# Patient Record
Sex: MALE | Race: WHITE | NOT HISPANIC OR LATINO | Employment: FULL TIME | URBAN - METROPOLITAN AREA
[De-identification: names, ages, dates, MRNs, and addresses within clinical notes are randomized per-mention and may not be internally consistent; named-entity substitution may affect disease eponyms.]

---

## 2018-08-13 ENCOUNTER — RECORDS - HEALTHEAST (OUTPATIENT)
Dept: LAB | Facility: CLINIC | Age: 58
End: 2018-08-13

## 2018-08-13 LAB
ALBUMIN SERPL-MCNC: 4 G/DL (ref 3.5–5)
ALP SERPL-CCNC: 88 U/L (ref 45–120)
ALT SERPL W P-5'-P-CCNC: 30 U/L (ref 0–45)
ANION GAP SERPL CALCULATED.3IONS-SCNC: 11 MMOL/L (ref 5–18)
AST SERPL W P-5'-P-CCNC: 21 U/L (ref 0–40)
BILIRUB SERPL-MCNC: 1 MG/DL (ref 0–1)
BUN SERPL-MCNC: 19 MG/DL (ref 8–22)
CALCIUM SERPL-MCNC: 9.2 MG/DL (ref 8.5–10.5)
CHLORIDE BLD-SCNC: 108 MMOL/L (ref 98–107)
CHOLEST SERPL-MCNC: 194 MG/DL
CO2 SERPL-SCNC: 21 MMOL/L (ref 22–31)
CREAT SERPL-MCNC: 0.98 MG/DL (ref 0.7–1.3)
FASTING STATUS PATIENT QL REPORTED: YES
GFR SERPL CREATININE-BSD FRML MDRD: >60 ML/MIN/1.73M2
GLUCOSE BLD-MCNC: 97 MG/DL (ref 70–125)
HDLC SERPL-MCNC: 41 MG/DL
LDLC SERPL CALC-MCNC: 121 MG/DL
POTASSIUM BLD-SCNC: 4.2 MMOL/L (ref 3.5–5)
PROT SERPL-MCNC: 6.6 G/DL (ref 6–8)
PSA SERPL-MCNC: 0.4 NG/ML (ref 0–3.5)
SODIUM SERPL-SCNC: 140 MMOL/L (ref 136–145)
TRIGL SERPL-MCNC: 161 MG/DL

## 2023-01-13 ENCOUNTER — LAB REQUISITION (OUTPATIENT)
Dept: LAB | Facility: CLINIC | Age: 63
End: 2023-01-13
Payer: COMMERCIAL

## 2023-01-13 DIAGNOSIS — Z13.220 ENCOUNTER FOR SCREENING FOR LIPOID DISORDERS: ICD-10-CM

## 2023-01-13 DIAGNOSIS — Z12.5 ENCOUNTER FOR SCREENING FOR MALIGNANT NEOPLASM OF PROSTATE: ICD-10-CM

## 2023-01-13 LAB
CHOLEST SERPL-MCNC: 214 MG/DL
HDLC SERPL-MCNC: 48 MG/DL
LDLC SERPL CALC-MCNC: 133 MG/DL
NONHDLC SERPL-MCNC: 166 MG/DL
PSA SERPL-MCNC: 0.51 NG/ML (ref 0–4.5)
TRIGL SERPL-MCNC: 164 MG/DL

## 2023-01-13 PROCEDURE — 80061 LIPID PANEL: CPT | Mod: ORL | Performed by: PHYSICIAN ASSISTANT

## 2023-01-13 PROCEDURE — G0103 PSA SCREENING: HCPCS | Mod: ORL | Performed by: PHYSICIAN ASSISTANT

## 2023-08-06 ENCOUNTER — HOSPITAL ENCOUNTER (OUTPATIENT)
Facility: CLINIC | Age: 63
Setting detail: OBSERVATION
Discharge: HOME | End: 2023-08-07
Attending: EMERGENCY MEDICINE | Admitting: HOSPITALIST
Payer: COMMERCIAL

## 2023-08-06 ENCOUNTER — APPOINTMENT (OUTPATIENT)
Dept: RADIOLOGY | Facility: CLINIC | Age: 63
End: 2023-08-06
Attending: EMERGENCY MEDICINE
Payer: COMMERCIAL

## 2023-08-06 DIAGNOSIS — R07.9 CHEST PAIN, UNSPECIFIED TYPE: ICD-10-CM

## 2023-08-06 PROBLEM — E66.3 OVERWEIGHT: Status: ACTIVE | Noted: 2023-08-06

## 2023-08-06 LAB
ANION GAP SERPL CALCULATED.3IONS-SCNC: 10 MMOL/L (ref 5–18)
APTT PPP: 29 SECONDS (ref 22–38)
ATRIAL RATE - MUSE: 78 BPM
BASOPHILS # BLD AUTO: 0.1 10E3/UL (ref 0–0.2)
BASOPHILS NFR BLD AUTO: 1 %
BUN SERPL-MCNC: 16 MG/DL (ref 8–22)
CALCIUM SERPL-MCNC: 8.9 MG/DL (ref 8.5–10.5)
CHLORIDE BLD-SCNC: 106 MMOL/L (ref 98–107)
CO2 SERPL-SCNC: 23 MMOL/L (ref 22–31)
CREAT SERPL-MCNC: 1.05 MG/DL (ref 0.7–1.3)
D DIMER PPP FEU-MCNC: <=0.27 UG/ML FEU (ref 0–0.5)
DIASTOLIC BLOOD PRESSURE - MUSE: NORMAL MMHG
EOSINOPHIL # BLD AUTO: 0.2 10E3/UL (ref 0–0.7)
EOSINOPHIL NFR BLD AUTO: 2 %
ERYTHROCYTE [DISTWIDTH] IN BLOOD BY AUTOMATED COUNT: 13.1 % (ref 10–15)
GFR SERPL CREATININE-BSD FRML MDRD: 81 ML/MIN/1.73M2
GLUCOSE BLD-MCNC: 90 MG/DL (ref 70–125)
HCT VFR BLD AUTO: 46.1 % (ref 40–53)
HGB BLD-MCNC: 15.6 G/DL (ref 13.3–17.7)
IMM GRANULOCYTES # BLD: 0 10E3/UL
IMM GRANULOCYTES NFR BLD: 0 %
INR PPP: 0.97 (ref 0.85–1.15)
INTERPRETATION ECG - MUSE: NORMAL
LYMPHOCYTES # BLD AUTO: 2.1 10E3/UL (ref 0.8–5.3)
LYMPHOCYTES NFR BLD AUTO: 31 %
MCH RBC QN AUTO: 31 PG (ref 26.5–33)
MCHC RBC AUTO-ENTMCNC: 33.8 G/DL (ref 31.5–36.5)
MCV RBC AUTO: 92 FL (ref 78–100)
MONOCYTES # BLD AUTO: 0.6 10E3/UL (ref 0–1.3)
MONOCYTES NFR BLD AUTO: 9 %
NEUTROPHILS # BLD AUTO: 3.8 10E3/UL (ref 1.6–8.3)
NEUTROPHILS NFR BLD AUTO: 57 %
NRBC # BLD AUTO: 0 10E3/UL
NRBC BLD AUTO-RTO: 0 /100
P AXIS - MUSE: 31 DEGREES
PLATELET # BLD AUTO: 196 10E3/UL (ref 150–450)
POTASSIUM BLD-SCNC: 3.9 MMOL/L (ref 3.5–5)
PR INTERVAL - MUSE: 152 MS
QRS DURATION - MUSE: 92 MS
QT - MUSE: 364 MS
QTC - MUSE: 414 MS
R AXIS - MUSE: 37 DEGREES
RBC # BLD AUTO: 5.03 10E6/UL (ref 4.4–5.9)
SODIUM SERPL-SCNC: 139 MMOL/L (ref 136–145)
SYSTOLIC BLOOD PRESSURE - MUSE: NORMAL MMHG
T AXIS - MUSE: -2 DEGREES
TROPONIN I SERPL-MCNC: 0.01 NG/ML (ref 0–0.29)
TROPONIN I SERPL-MCNC: 0.01 NG/ML (ref 0–0.29)
TROPONIN I SERPL-MCNC: <0.01 NG/ML (ref 0–0.29)
VENTRICULAR RATE- MUSE: 78 BPM
WBC # BLD AUTO: 6.7 10E3/UL (ref 4–11)

## 2023-08-06 PROCEDURE — 84484 ASSAY OF TROPONIN QUANT: CPT | Mod: 91 | Performed by: HOSPITALIST

## 2023-08-06 PROCEDURE — 84484 ASSAY OF TROPONIN QUANT: CPT | Performed by: EMERGENCY MEDICINE

## 2023-08-06 PROCEDURE — 85025 COMPLETE CBC W/AUTO DIFF WBC: CPT | Performed by: EMERGENCY MEDICINE

## 2023-08-06 PROCEDURE — 85610 PROTHROMBIN TIME: CPT | Performed by: EMERGENCY MEDICINE

## 2023-08-06 PROCEDURE — 93005 ELECTROCARDIOGRAM TRACING: CPT | Performed by: EMERGENCY MEDICINE

## 2023-08-06 PROCEDURE — 85730 THROMBOPLASTIN TIME PARTIAL: CPT | Performed by: EMERGENCY MEDICINE

## 2023-08-06 PROCEDURE — G0378 HOSPITAL OBSERVATION PER HR: HCPCS

## 2023-08-06 PROCEDURE — 36415 COLL VENOUS BLD VENIPUNCTURE: CPT | Performed by: EMERGENCY MEDICINE

## 2023-08-06 PROCEDURE — 99285 EMERGENCY DEPT VISIT HI MDM: CPT | Mod: 25

## 2023-08-06 PROCEDURE — 85379 FIBRIN DEGRADATION QUANT: CPT | Performed by: EMERGENCY MEDICINE

## 2023-08-06 PROCEDURE — 71045 X-RAY EXAM CHEST 1 VIEW: CPT

## 2023-08-06 PROCEDURE — 250N000013 HC RX MED GY IP 250 OP 250 PS 637: Performed by: EMERGENCY MEDICINE

## 2023-08-06 PROCEDURE — 36415 COLL VENOUS BLD VENIPUNCTURE: CPT | Performed by: HOSPITALIST

## 2023-08-06 PROCEDURE — 80048 BASIC METABOLIC PNL TOTAL CA: CPT | Performed by: EMERGENCY MEDICINE

## 2023-08-06 PROCEDURE — 99223 1ST HOSP IP/OBS HIGH 75: CPT | Performed by: HOSPITALIST

## 2023-08-06 RX ORDER — NITROGLYCERIN 0.4 MG/1
0.4 TABLET SUBLINGUAL EVERY 5 MIN PRN
Status: DISCONTINUED | OUTPATIENT
Start: 2023-08-06 | End: 2023-08-07 | Stop reason: HOSPADM

## 2023-08-06 RX ORDER — ACETAMINOPHEN 650 MG/1
650 SUPPOSITORY RECTAL EVERY 6 HOURS PRN
Status: DISCONTINUED | OUTPATIENT
Start: 2023-08-06 | End: 2023-08-07 | Stop reason: HOSPADM

## 2023-08-06 RX ORDER — ASPIRIN 81 MG/1
162 TABLET, CHEWABLE ORAL ONCE
Status: DISCONTINUED | OUTPATIENT
Start: 2023-08-06 | End: 2023-08-06

## 2023-08-06 RX ORDER — ACETAMINOPHEN 325 MG/1
650 TABLET ORAL EVERY 6 HOURS PRN
Status: DISCONTINUED | OUTPATIENT
Start: 2023-08-06 | End: 2023-08-07 | Stop reason: HOSPADM

## 2023-08-06 RX ORDER — MAGNESIUM HYDROXIDE/ALUMINUM HYDROXICE/SIMETHICONE 120; 1200; 1200 MG/30ML; MG/30ML; MG/30ML
30 SUSPENSION ORAL EVERY 4 HOURS PRN
Status: DISCONTINUED | OUTPATIENT
Start: 2023-08-06 | End: 2023-08-07 | Stop reason: HOSPADM

## 2023-08-06 RX ORDER — ASPIRIN 81 MG/1
162 TABLET, CHEWABLE ORAL ONCE
Status: COMPLETED | OUTPATIENT
Start: 2023-08-06 | End: 2023-08-06

## 2023-08-06 RX ORDER — ASPIRIN 81 MG/1
81 TABLET ORAL DAILY
Status: DISCONTINUED | OUTPATIENT
Start: 2023-08-07 | End: 2023-08-07 | Stop reason: HOSPADM

## 2023-08-06 RX ORDER — MULTIVITAMIN,THERAPEUTIC
1 TABLET ORAL DAILY
COMMUNITY

## 2023-08-06 RX ADMIN — NITROGLYCERIN 0.4 MG: 0.4 TABLET SUBLINGUAL at 13:43

## 2023-08-06 RX ADMIN — ASPIRIN 81 MG CHEWABLE TABLET 162 MG: 81 TABLET CHEWABLE at 15:42

## 2023-08-06 ASSESSMENT — ACTIVITIES OF DAILY LIVING (ADL)
ADLS_ACUITY_SCORE: 35
ADLS_ACUITY_SCORE: 31
ADLS_ACUITY_SCORE: 31
DEPENDENT_IADLS:: INDEPENDENT
ADLS_ACUITY_SCORE: 31
ADLS_ACUITY_SCORE: 31

## 2023-08-06 NOTE — ED NOTES
Pt states pain started when pulling weeds @ 1130 today. Pain increases with deep breath.  Family Hx of MI.  States only med is multivitamin.

## 2023-08-06 NOTE — H&P
"Hospital Medicine Service History and Physical  [facility]: [facility]    Tim Eaton is a 63 year old male who  has no past medical history on file.  Bilateral cataracts  Chief Complaint: Chest pain    Assessment and Plan  Principal Problem:    Chest pain, unspecified type  Active Problems:    Overweight  Chest pain order set with nm stress, tele, trops. ROHIT @ [place]. PRN nitros    Estimated body mass index is 27.89 kg/m  as calculated from the following:    Height as of this encounter: 1.803 m (5' 11\").    Weight as of this encounter: 90.7 kg (200 lb).  DVTP: short stay  Code Status: No Order  Disposition: Observation     History of Present Illness  Tim Eaton presents after sudden \"sharp\" chest pain that began while he was pulling weeds today. He had associated diaphoresis. No Nausea or SOB. His Sx persisted for a few hours prior to arrival. He took aspirin at home. He had a similar event in December shoveling heavy snow. He's concerned given his father  of an MI at 63. He's now asymptomatic after nitros in the ED. Denies tobacco or EtOH use. He works a mostly sedentary job at [employer]. Takes no meds, no recent med changes or vaccination. His wife Petr is with him.    ED triage note:  Patient started having chest pain doing yard work around 11:15. Patient states it feels like he's been punched in the chest. Family history of MI in 50s and 60s. Did have similar experience back in December clearing snow.    ED course:  In the ED, patient presents hemodynamically stable, afebrile, stable on room air  Initial troponin 0.01, normal BMP and CBC, negative D-dimer  EKG normal sinus, S waves lead I     Results for orders placed or performed during the hospital encounter of 23   XR Chest Port 1 View    Impression    IMPRESSION: Negative chest.      Medications   nitroGLYcerin (NITROSTAT) sublingual tablet 0.4 mg (0.4 mg Sublingual $Given 23 1347)   aspirin (ASA) chewable tablet 162 mg (162 mg " Oral $Given 8/6/23 9261)       Physical exam:  Appears nad in the bed  Anicteric sclera, PERRL  No JVD  RRR without murmur  Lungs CTA B/L  Abd soft NT  normal affect, alert  Vital signs reviewed by me    Wt Readings from Last 4 Encounters:   08/06/23 90.7 kg (200 lb)          family history is not on file.father - MI   has no past surgical history on file.   No Known Allergies    Mumtaz Schmitt MD, MPH  [facility]  [facility]  [facility]   Phone: #[phone]

## 2023-08-06 NOTE — PHARMACY-ADMISSION MEDICATION HISTORY
Pharmacist Admission Medication History    Admission medication history is complete. The information provided in this note is only as accurate as the sources available at the time of the update.    Medication reconciliation/reorder completed by provider prior to medication history? No    Information Source(s): Patient and CareEverywhere/SureScripts via in-person using droplet mask.    Pertinent Information: Doesn't take any prescriptions currently.    Changes made to PTA medication list:  Added: Multivitamin  Deleted: None  Changed: None    Medication Affordability:  Not including over the counter (OTC) medications, was there a time in the past 3 months when you did not take your medications as prescribed because of cost?: No    Allergies reviewed with patient and updates made in EHR: yes    Medication History Completed By: Brittney Ruffin, Brenna, BCPS, DPLA 8/6/2023 2:16 PM    Prior to Admission medications    Medication Sig Last Dose Taking? Auth Provider Long Term End Date   multivitamin, therapeutic (THERA-VIT) TABS tablet Take 1 tablet by mouth daily 8/4/2023 Yes Unknown, Entered By History

## 2023-08-06 NOTE — ED NOTES
EMERGENCY DEPARTMENT SIGN OUT NOTE        ED COURSE AND MEDICAL DECISION MAKING  Patient was signed out to me by Dr Aramis Quinteros at 2:15 PM.    In brief, Tim Eaton is a 63 year old male who initially presented for evaluation of chest pain and shortness of breath that started around 11:15 AM today while doing yard work.    At time of sign out, disposition was pending troponin, CXR, and likely admission.    Troponin is negative.  Chest x-ray is unremarkable.  Patient was admitted to the hospitalist, Dr. Schmitt for ACS rule out.    FINAL IMPRESSION    1. Chest pain, unspecified type        ED MEDS  Medications   nitroGLYcerin (NITROSTAT) sublingual tablet 0.4 mg (0.4 mg Sublingual $Given 8/6/23 2370)   aspirin (ASA) chewable tablet 162 mg (has no administration in time range)       LAB  Labs Ordered and Resulted from Time of ED Arrival to Time of ED Departure   BASIC METABOLIC PANEL - Normal       Result Value    Sodium 139      Potassium 3.9      Chloride 106      Carbon Dioxide (CO2) 23      Anion Gap 10      Urea Nitrogen 16      Creatinine 1.05      Calcium 8.9      Glucose 90      GFR Estimate 81     TROPONIN I - Normal    Troponin I 0.01     INR - Normal    INR 0.97     PARTIAL THROMBOPLASTIN TIME - Normal    aPTT 29     D DIMER QUANTITATIVE - Normal    D-Dimer Quantitative <=0.27     CBC WITH PLATELETS AND DIFFERENTIAL    WBC Count 6.7      RBC Count 5.03      Hemoglobin 15.6      Hematocrit 46.1      MCV 92      MCH 31.0      MCHC 33.8      RDW 13.1      Platelet Count 196      % Neutrophils 57      % Lymphocytes 31      % Monocytes 9      % Eosinophils 2      % Basophils 1      % Immature Granulocytes 0      NRBCs per 100 WBC 0      Absolute Neutrophils 3.8      Absolute Lymphocytes 2.1      Absolute Monocytes 0.6      Absolute Eosinophils 0.2      Absolute Basophils 0.1      Absolute Immature Granulocytes 0.0      Absolute NRBCs 0.0         RADIOLOGY    XR Chest Port 1 View   Final Result   IMPRESSION: Negative  chest.          DISCHARGE MEDS  New Prescriptions    No medications on file       I, Jonas Kebede, am serving as a scribe to documentservices personally performed by Claudia Alejo, * based on my observation and the provider's statements to me. I, Claudia Deluca, * attest that Jonas Kebede is acting in a scribe capacity, has observed my performance of the services and has documented them in accordance with my direction.      Claudia Deulca MD  [facility] EMERGENCY ROOM  [facility] [facility] [address] [phone]       Claudia Deluca MD  08/06/23 5679

## 2023-08-06 NOTE — PROGRESS NOTES
"Care Management Initial Consult    General Information  Assessment completed with: Patient, Spouse or significant other, wife Petr at bedside  Primary Care Provider verified and updated as needed: Yes- goes to [facility] in [place]. His PCP retired but he is aware of how to establish new PCP at same clinic, wife also able to help. Denies needs from CM.   Readmission within the last 30 days: no previous admission in last 30 days     Advance Care Planning: Advance Care Planning Reviewed:  (no HCD on file, declines blank HCD, verbally states \"my wife Petr\" would make medical decisions for him IF he were not able to make them for himself)    Communication Assessment  Patient's communication style: spoken language (English or Bilingual)        Cognitive  Cognitive/Neuro/Behavioral: alert and appropriately oriented, able to provide detailed history, pleasant                     Living Environment:   People in home: spouse  Petr  Current living Arrangements: house (split level)      Able to return to prior arrangements: yes     Family/Social Support:  Care provided by: self  Provides care for: no one  Marital Status:   Wife, Children (2 adult sons)  Petr       Description of Support System: Supportive, Involved       Current Resources:   Patient receiving home care services: No  Community Resources: None  Equipment /supplies currently used at home:  None     Employment/Financial:  Employment Status: employed full-time for [employer]   Financial Concerns: No concerns identified   Referral to Financial Worker: No     Does the patient's insurance plan have a 3 day qualifying hospital stay waiver?  No- unable to confirm. Has [employer] employer based insurance plan. Do not anticipate need for TCU placement at hospital discharge.     Lifestyle & Psychosocial Needs:  Social Determinants of Health     Tobacco Use: Not on file   Alcohol Use: Not on file   Financial Resource Strain: Not on file   Food Insecurity: " Not on file   Transportation Needs: Not on file   Physical Activity: Not on file   Stress: Not on file   Social Connections: Not on file   Intimate Partner Violence: Not on file   Depression: Not on file   Housing Stability: Not on file     Functional Status:  Prior to admission patient needed assistance:   Dependent ADLs:: Independent  Dependent IADLs:: Independent     Mental Health Status:  Mental Health Status:  (denies)       Chemical Dependency Status:  Chemical Dependency Status:  (denies)           Values/Beliefs:  Spiritual, Cultural Beliefs, Protestant Practices, Values that affect care: no             Additional Information:  Chart reviewed.     CM met with patient and wife Petr at bedside; assessment completed. Lives with wife in their own private [facility] level home. He is independent with all cares, including driving and still works full time for [employer]. No assistive devices. No private duty or skilled HC services.     Anticipate return home with wife; no CM needs. Wife will transport home at hospital discharge.     Elizabeth Rose RN

## 2023-08-06 NOTE — ED PROVIDER NOTES
EMERGENCY DEPARTMENT ENCOUNTER      NAME: Tim Eaton  AGE: 63 year old male  YOB: 1960  MRN: [id number]  EVALUATION DATE & TIME: 2023  1:10 PM    PCP: No primary care provider on file.    ED PROVIDER: Aramis Quinteros D.O.      Chief Complaint   Patient presents with    Chest Pain       FINAL IMPRESSION:  1. Nonspecific chest pain        ED COURSE & MEDICAL DECISION MAKIN:22 PM I met with the patient to gather history and to perform my initial exam. I discussed the plan for care while in the Emergency Department.  1:22 PM Talked about discharge vs going home with patient and patient does not feel comfortable going home.  2:05 PM Patient signed out to the oncoming provider, Dr. Deluca.         Pertinent Labs & Imaging studies reviewed. (See chart for details)  63 year old male presents to the Emergency Department for evaluation of left-sided chest pain with shortness of breath.  Patient does have significant medical history in his family of both his grandfather and father dying in the mid 50s early 60s from MIs.  Differential today does include ACS, PE, dissection, pneumothorax, other emergent process.  The chest pain was not reproducible in nature, exam was otherwise unremarkable.  EKG did not show any evidence of ischemia arrhythmia.  Lab testing is pending at time of turnover, but do anticipate admission.    Medical Decision Making    History:  Supplemental history from: Documented in chart, if applicable  External Record(s) reviewed: Documented in chart, if applicable.    Work Up:  Chart documentation includes differential considered and any EKGs or imaging independently interpreted by provider, where specified.  In additional to work up documented, I considered the following work up: Documented in chart, if applicable.    External consultation:  Discussion of management with another provider: Documented in chart, if applicable    Complicating factors:  Care impacted by chronic illness:  N/A  Care affected by social determinants of health: N/A    Disposition considerations: [facility] and he does not feel comfortable going home.        At the conclusion of the encounter I discussed the results of all of the tests and the disposition. The questions were answered. The patient or family acknowledged understanding and was agreeable with the care plan.        HPI    Patient information was obtained from: Patient    Use of : N/A     Tim Eaton is a 63 year old male who presents with chest pain.    Per Chart Review: No pertinent information available.    Patient reports that he had developed sudden chest pain around 11:15 today () while doing yard work. Pain has been constant since. He also endorsed feeling mildly short of breath. His pain is not worsened with exertion but states that he had been exerting himself yesterday (). He has had similar chest pain symptoms last 2022 (while clearing snow per nurse triage note) but it was not this constant. He took two Excedrin with aspirin today.    He denied any fever, cough, congestion, nausea, vomiting, or diarrhea. No personal history of medical problems. Patient's father and grandfather both  of heart attacks in there 60s. Has a history of cataract surgery. No other complaints at this time    REVIEW OF SYSTEMS  Constitutional:  Denies fever, chills, weight loss or weakness.  Eyes:  No pain, discharge, redness.  HENT:  Denies sore throat, ear pain, congestion  Respiratory: No wheeze or cough. Endorses shortness of breath.  Cardiovascular:  No palpitations. Endorses chest pain.  GI:  Denies abdominal pain, nausea, vomiting, diarrhea  : Denies dysuria, hematuria  Musculoskeletal:  Denies any new muscle/joint pain, swelling or loss of function.  Skin:  Denies rash, pallor  Neurologic:  Denies headache, focal weakness or sensory changes  Lymph: Denies swollen nodes    All other systems  "negative unless noted in HPI.    PAST MEDICAL HISTORY:  No past medical history on file.    PAST SURGICAL HISTORY:  No past surgical history on file.      CURRENT MEDICATIONS:    Current Facility-Administered Medications   Medication    nitroGLYcerin (NITROSTAT) sublingual tablet 0.4 mg     No current outpatient medications on file.         ALLERGIES:  No Known Allergies    FAMILY HISTORY:  No family history on file.    SOCIAL HISTORY:  Social History     Socioeconomic History    Marital status:        VITALS:  Patient Vitals for the past 24 hrs:   BP Temp Temp src Pulse Resp SpO2 Height Weight   08/06/23 1345 128/84 -- -- 62 19 97 % -- --   08/06/23 1327 131/85 -- -- 78 19 96 % -- --   08/06/23 1302 -- 97.2  F (36.2  C) Temporal -- -- -- -- --   08/06/23 1301 (!) 142/104 -- -- 83 18 95 % -- --   08/06/23 1300 -- -- -- -- -- -- 1.803 m (5' 11\") 90.7 kg (200 lb)       PHYSICAL EXAM    VITAL SIGNS: /84   Pulse 62   Temp 97.2  F (36.2  C) (Temporal)   Resp 19   Ht 1.803 m (5' 11\")   Wt 90.7 kg (200 lb)   SpO2 97%   BMI 27.89 kg/m      General Appearance: Well-appearing, well-nourished, no acute distress   Head:  Normocephalic, without obvious abnormality, atraumatic  Eyes:  PERRL, conjunctiva/corneas clear, EOM's intact,  ENT:  Lips, mucosa, and tongue normal, membranes are moist without pallor  Neck:  Normal ROM, symmetrical, trachea midline    Cardio:  Regular rate and rhythm, no murmur, rub or gallop, 2+ pulses symmetric in all extremities  Pulm:  Clear to auscultation bilaterally, respirations unlabored,  Abdomen:  Soft, non-tender, no rebound or guarding.  Musculoskeletal: Full ROM, no edema, no cyanosis, good ROM of major joints  Integument:  Warm, Dry, No erythema, No rash.    Neurologic:  Alert & oriented.  No focal deficits appreciated.  Ambulatory.  Psychiatric:  Affect normal, Judgment normal, Mood normal.      LABS  Results for orders placed or performed during the hospital encounter of " 08/06/23 (from the past 24 hour(s))   ECG 12-LEAD WITH MUSE (LHE)   Result Value Ref Range    Systolic Blood Pressure  mmHg    Diastolic Blood Pressure  mmHg    Ventricular Rate 78 BPM    Atrial Rate 78 BPM    IA Interval 152 ms    QRS Duration 92 ms     ms    QTc 414 ms    P Axis 31 degrees    R AXIS 37 degrees    T Axis -2 degrees    Interpretation ECG       Sinus rhythm  Normal ECG  No previous ECGs available  Confirmed by SEE ED PROVIDER NOTE FOR, ECG INTERPRETATION ([id number]),  Jose Manuel Ingram ([id number]) on 8/6/2023 1:31:37 PM     CBC with platelets differential    Narrative    The following orders were created for panel order CBC with platelets differential.  Procedure                               Abnormality         Status                     ---------                               -----------         ------                     CBC with platelets and d...[[id number]]                      Final result                 Please view results for these tests on the individual orders.   Basic metabolic panel   Result Value Ref Range    Sodium 139 136 - 145 mmol/L    Potassium 3.9 3.5 - 5.0 mmol/L    Chloride 106 98 - 107 mmol/L    Carbon Dioxide (CO2) 23 22 - 31 mmol/L    Anion Gap 10 5 - 18 mmol/L    Urea Nitrogen 16 8 - 22 mg/dL    Creatinine 1.05 0.70 - 1.30 mg/dL    Calcium 8.9 8.5 - 10.5 mg/dL    Glucose 90 70 - 125 mg/dL    GFR Estimate 81 >60 mL/min/1.73m2   INR   Result Value Ref Range    INR 0.97 0.85 - 1.15   Partial thromboplastin time   Result Value Ref Range    aPTT 29 22 - 38 Seconds   D dimer quantitative   Result Value Ref Range    D-Dimer Quantitative <=0.27 0.00 - 0.50 ug/mL FEU    Narrative    This D-dimer assay is intended for use in conjunction with a clinical pretest probability assessment model to exclude pulmonary embolism (PE) and deep venous thrombosis (DVT) in outpatients suspected of PE or DVT. The cut-off value is 0.50 ug/mL FEU.   CBC with platelets and differential   Result Value  Ref Range    WBC Count 6.7 4.0 - 11.0 10e3/uL    RBC Count 5.03 4.40 - 5.90 10e6/uL    Hemoglobin 15.6 13.3 - 17.7 g/dL    Hematocrit 46.1 40.0 - 53.0 %    MCV 92 78 - 100 fL    MCH 31.0 26.5 - 33.0 pg    MCHC 33.8 31.5 - 36.5 g/dL    RDW 13.1 10.0 - 15.0 %    Platelet Count 196 150 - 450 10e3/uL    % Neutrophils 57 %    % Lymphocytes 31 %    % Monocytes 9 %    % Eosinophils 2 %    % Basophils 1 %    % Immature Granulocytes 0 %    NRBCs per 100 WBC 0 <1 /100    Absolute Neutrophils 3.8 1.6 - 8.3 10e3/uL    Absolute Lymphocytes 2.1 0.8 - 5.3 10e3/uL    Absolute Monocytes 0.6 0.0 - 1.3 10e3/uL    Absolute Eosinophils 0.2 0.0 - 0.7 10e3/uL    Absolute Basophils 0.1 0.0 - 0.2 10e3/uL    Absolute Immature Granulocytes 0.0 <=0.4 10e3/uL    Absolute NRBCs 0.0 10e3/uL         RADIOLOGY  XR Chest Port 1 View    (Results Pending)        EKG:    Rate: 78 bpm  Rhythm: Normal Sinus Rhythm  Axis: Normal  Interval: Normal  Conduction: Normal  QRS: Normal  ST: Normal  T-wave: Normal  QT: Not prolonged  Comparison EKG: no previous available for comparison  Impression:  No acute ischemic change   I have independently reviewed and interpreted today's EKG, pending Cardiologist read          MEDICATIONS GIVEN IN THE EMERGENCY:  Medications   nitroGLYcerin (NITROSTAT) sublingual tablet 0.4 mg (0.4 mg Sublingual $Given 8/6/23 4841)       NEW PRESCRIPTIONS STARTED AT TODAY'S ER VISIT  New Prescriptions    No medications on file        I, Ra Copeland , am serving as a scribe to document services personally performed by Aramis Quinteros D.O., based on my observations and the provider's statements to me.  I, Aramis Quinteros D.O., attest that Ra Copeland  is acting in a scribe capacity, has observed my performance of the services and has documented them in accordance with my direction.     Aramis Quinteros D.O.  Emergency Medicine  [facility] EMERGENCY ROOM  [address] [facility]  [address]  [phone]  Dept: [phone]       Aramis Quinteros,   08/06/23 1411

## 2023-08-06 NOTE — PROGRESS NOTES
Received patient from ED ambulating independently with wife at bedside. Vital signs stable and patient is A&Ox4. Patient is on a cardiac diet for the time being until NPO at midnight for tests tomorrow. Educated on hospital policies, plans, tests, and environment. No questions at this time reported by patient and family. IV in place and patent. Labs drawn.

## 2023-08-06 NOTE — ED TRIAGE NOTES
Patient started having chest pain doing yard work around 11:15. Patient states it feels like he's been punched in the chest. Family history of MI in 50s and 60s. Did have similar experience back in December clearing snow.

## 2023-08-07 ENCOUNTER — APPOINTMENT (OUTPATIENT)
Dept: CARDIOLOGY | Facility: CLINIC | Age: 63
End: 2023-08-07
Attending: HOSPITALIST
Payer: COMMERCIAL

## 2023-08-07 ENCOUNTER — APPOINTMENT (OUTPATIENT)
Dept: NUCLEAR MEDICINE | Facility: CLINIC | Age: 63
End: 2023-08-07
Attending: HOSPITALIST
Payer: COMMERCIAL

## 2023-08-07 VITALS
HEART RATE: 81 BPM | WEIGHT: 202.2 LBS | DIASTOLIC BLOOD PRESSURE: 71 MMHG | OXYGEN SATURATION: 96 % | HEIGHT: 71 IN | SYSTOLIC BLOOD PRESSURE: 120 MMHG | BODY MASS INDEX: 28.31 KG/M2 | RESPIRATION RATE: 22 BRPM | TEMPERATURE: 98.6 F

## 2023-08-07 LAB
CV STRESS CURRENT BP HE: NORMAL
CV STRESS CURRENT HR HE: 104
CV STRESS CURRENT HR HE: 104
CV STRESS CURRENT HR HE: 105
CV STRESS CURRENT HR HE: 105
CV STRESS CURRENT HR HE: 106
CV STRESS CURRENT HR HE: 106
CV STRESS CURRENT HR HE: 107
CV STRESS CURRENT HR HE: 108
CV STRESS CURRENT HR HE: 110
CV STRESS CURRENT HR HE: 111
CV STRESS CURRENT HR HE: 113
CV STRESS CURRENT HR HE: 116
CV STRESS CURRENT HR HE: 117
CV STRESS CURRENT HR HE: 120
CV STRESS CURRENT HR HE: 122
CV STRESS CURRENT HR HE: 123
CV STRESS CURRENT HR HE: 128
CV STRESS CURRENT HR HE: 134
CV STRESS CURRENT HR HE: 136
CV STRESS CURRENT HR HE: 136
CV STRESS CURRENT HR HE: 145
CV STRESS CURRENT HR HE: 145
CV STRESS CURRENT HR HE: 147
CV STRESS CURRENT HR HE: 69
CV STRESS CURRENT HR HE: 78
CV STRESS CURRENT HR HE: 97
CV STRESS CURRENT HR HE: 98
CV STRESS CURRENT HR HE: 99
CV STRESS DEVIATION TIME HE: NORMAL
CV STRESS ECHO PERCENT HR HE: NORMAL
CV STRESS EXERCISE STAGE HE: NORMAL
CV STRESS EXERCISE STAGE REACHED HE: NORMAL
CV STRESS FINAL RESTING BP HE: NORMAL
CV STRESS FINAL RESTING HR HE: 98
CV STRESS MAX HR HE: 147
CV STRESS MAX TREADMILL GRADE HE: 16
CV STRESS MAX TREADMILL SPEED HE: 4.2
CV STRESS PEAK DIA BP HE: NORMAL
CV STRESS PEAK SYS BP HE: NORMAL
CV STRESS PHASE HE: NORMAL
CV STRESS PROTOCOL HE: NORMAL
CV STRESS RESTING PT POSITION HE: NORMAL
CV STRESS ST DEVIATION AMOUNT HE: NORMAL
CV STRESS ST DEVIATION ELEVATION HE: NORMAL
CV STRESS ST EVELATION AMOUNT HE: NORMAL
CV STRESS TEST TYPE HE: NORMAL
CV STRESS TOTAL STAGE TIME MIN 1 HE: NORMAL
RATE PRESSURE PRODUCT: NORMAL
STRESS ANGINA INDEX: 0
STRESS ECHO BASELINE DIASTOLIC HE: 82
STRESS ECHO BASELINE HR: 72
STRESS ECHO BASELINE SYSTOLIC BP: 120
STRESS ECHO CALCULATED PERCENT HR: 92 %
STRESS ECHO LAST STRESS DIASTOLIC BP: 88
STRESS ECHO LAST STRESS HR: 145
STRESS ECHO LAST STRESS SYSTOLIC BP: 166
STRESS ECHO POST ESTIMATED WORKLOAD: 10.5
STRESS ECHO POST EXERCISE DUR MIN: 9
STRESS ECHO POST EXERCISE DUR SEC: 5
STRESS ECHO TARGET HR: 159

## 2023-08-07 PROCEDURE — 93017 CV STRESS TEST TRACING ONLY: CPT | Performed by: INTERNAL MEDICINE

## 2023-08-07 PROCEDURE — G0378 HOSPITAL OBSERVATION PER HR: HCPCS

## 2023-08-07 PROCEDURE — 93016 CV STRESS TEST SUPVJ ONLY: CPT | Performed by: INTERNAL MEDICINE

## 2023-08-07 PROCEDURE — 93018 CV STRESS TEST I&R ONLY: CPT | Performed by: GENERAL ACUTE CARE HOSPITAL

## 2023-08-07 PROCEDURE — 250N000013 HC RX MED GY IP 250 OP 250 PS 637: Performed by: HOSPITALIST

## 2023-08-07 PROCEDURE — 93017 CV STRESS TEST TRACING ONLY: CPT

## 2023-08-07 PROCEDURE — 78452 HT MUSCLE IMAGE SPECT MULT: CPT | Mod: 26 | Performed by: GENERAL ACUTE CARE HOSPITAL

## 2023-08-07 PROCEDURE — 78452 HT MUSCLE IMAGE SPECT MULT: CPT

## 2023-08-07 PROCEDURE — 99239 HOSP IP/OBS DSCHRG MGMT >30: CPT | Performed by: HOSPITALIST

## 2023-08-07 PROCEDURE — 343N000001 HC RX 343: Performed by: HOSPITALIST

## 2023-08-07 PROCEDURE — A9500 TC99M SESTAMIBI: HCPCS | Performed by: HOSPITALIST

## 2023-08-07 RX ADMIN — Medication 8.7 MILLICURIE: at 10:25

## 2023-08-07 RX ADMIN — ASPIRIN 81 MG: 81 TABLET, COATED ORAL at 08:30

## 2023-08-07 RX ADMIN — Medication 28.6 MILLICURIE: at 12:45

## 2023-08-07 ASSESSMENT — ACTIVITIES OF DAILY LIVING (ADL)
ADLS_ACUITY_SCORE: 31

## 2023-08-07 NOTE — PLAN OF CARE
Goal Outcome Evaluation:         Denies chest pain, sOB, nausea. Tolerating diet well. Acitivity as tolereated. Discharge instructions reviewed. IV removed.     Missy Virgen RN

## 2023-08-07 NOTE — PLAN OF CARE
PRIMARY DIAGNOSIS: CHEST PAIN  OUTPATIENT/OBSERVATION GOALS TO BE MET BEFORE DISCHARGE:  1. Ruled out acute coronary syndrome (negative or stable Troponin):   Stress test in AM  2. Pain Status: Pain free.  3. Appropriate provocative testing performed: No  - Stress Test Procedure: Tomorrow  - Interpretation of cardiac rhythm per telemetry tech: NSR    4. Cleared by Consultants (if applicable):No  5. Return to near baseline physical activity: Yes  Discharge Planner Nurse   Safe discharge environment identified: Yes  Barriers to discharge: Yes       Entered by: Latrice Contreras RN 08/06/2023 8:42 PM     Please review provider order for any additional goals.   Nurse to notify provider when observation goals have been met and patient is ready for discharge.          Problem: Plan of Care - These are the overarching goals to be used throughout the patient stay.    Goal: Plan of Care Review  Description: The Plan of Care Review/Shift note should be completed every shift.  The Outcome Evaluation is a brief statement about your assessment that the patient is improving, declining, or no change.  This information will be displayed automatically on your shift note.  Outcome: Progressing   Goal Outcome Evaluation:

## 2023-08-07 NOTE — PROGRESS NOTES
Nuclear stress test completed as ordered using the Jose A protocol.  No chest pain during exercise.  Images and interpretation pending.  Amber Hannon RN

## 2023-08-07 NOTE — PROGRESS NOTES
PRIMARY DIAGNOSIS: CHEST PAIN  OUTPATIENT/OBSERVATION GOALS TO BE MET BEFORE DISCHARGE:  1. Ruled out acute coronary syndrome (negative or stable Troponin):  Yes  2. Pain Status: Pain free.  3. Appropriate provocative testing performed: No - stress test  - Stress Test Procedure: Nuclear exercise stress test  - Interpretation of cardiac rhythm per telemetry tech: NSR    4. Cleared by Consultants (if applicable):N/A  5. Return to near baseline physical activity: Yes  Discharge Planner Nurse   Safe discharge environment identified: Yes  Barriers to discharge: Yes - awaiting stress test       Entered by: Mary Byrd RN 08/07/2023      Please review provider order for any additional goals.   Nurse to notify provider when observation goals have been met and patient is ready for discharge.

## 2023-08-07 NOTE — DISCHARGE SUMMARY
"[facility]  Hospitalist Discharge Summary      Date of Admission:  8/6/2023  Date of Discharge:  8/7/2023  Discharging Provider: Mumtaz Schmitt MD  Discharge Service: Hospitalist Service    Discharge Diagnoses   Chest pain    Clinically Significant Risk Factors     # Overweight: Estimated body mass index is 28.2 kg/m  as calculated from the following:    Height as of this encounter: 1.803 m (5' 11\").    Weight as of this encounter: 91.7 kg (202 lb 3.2 oz).       Follow-ups Needed After Discharge       Unresulted Labs Ordered in the Past 30 Days of this Admission       No orders found for last 31 day(s).            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  Symptoms were associated with diaphoresis and persisted for a few hours.  His telemetry and troponin monitoring were negative.  He underwent nuclear stress testing which were negative with normal EF.    Consultations This Hospital Stay   None    Code Status   Full Code    Time Spent on this Encounter   I, Mumtaz Schmitt MD, personally saw the patient today and spent greater than 30 minutes discharging this patient.       Mumtaz Schmitt MD  [facility]  [address] [address]  Phone: [phone]  Fax: [phone]  ______________________________________________________________________    Physical Exam   Vital Signs: Temp: 98.6  F (37  C) Temp src: Oral BP: 120/71 Pulse: 81   Resp: 22 SpO2: 96 % O2 Device: None (Room air)    Weight: 202 lbs 3.2 oz  Lying in bed, no distress  Regular rate and rhythm, no new murmur  Lungs clear to auscultation  Mentation normal, answers questions appropriately, normal affect and normal eye contact       Primary Care Physician   [facility]    Discharge Orders      Reason for your hospital stay    Chest " pain     Follow-up and recommended labs and tests     Follow up with primary care provider, [facility], within 7 days for hospital follow- up.  No follow up labs or test are needed.     Activity    Your activity upon discharge: activity as tolerated     Diet    Follow this diet upon discharge: Orders Placed This Encounter      Combination Diet Regular Diet; Low Saturated Fat Na <2400mg Diet, No Caffeine Diet       Significant Results and Procedures   Most Recent 3 CBC's:  Recent Labs   Lab Test 08/06/23  1339   WBC 6.7   HGB 15.6   MCV 92        Most Recent 3 BMP's:  Recent Labs   Lab Test 08/06/23  1339 08/13/18  0824    140   POTASSIUM 3.9 4.2   CHLORIDE 106 108*   CO2 23 21*   BUN 16 19   CR 1.05 0.98   ANIONGAP 10 11   GIANLUCA 8.9 9.2   GLC 90 97     Most Recent 2 LFT's:  Recent Labs   Lab Test 08/13/18  0824   AST 21   ALT 30   ALKPHOS 88   BILITOTAL 1.0   ,   Results for orders placed or performed during the hospital encounter of 08/06/23   XR Chest Port 1 View    Narrative    EXAM: XR CHEST PORT 1 VIEW  LOCATION: [facility]  DATE: 8/6/2023    INDICATION: chest pain  COMPARISON: None.      Impression    IMPRESSION: Negative chest.   NM MPI Treadmill (NUC CARD)     Value    Pharmacologic Protocol Jose A    Test Type Treadmill    Baseline HR 72    Baseline Systolic     Baseline Diastolic BP 82    Last Stress     Last Stress Systolic     Last Stress Diastolic BP 88    Target     PERCENT HR 85%    Exercise duration (min) 9    Exercise duration (sec) 5    Estimated workload 10.5    Exercise Stage Reached Stage 4    ST Deviation Elevation V4 0.5mm    Deviation Time II -1.2mm    ST Elevation Amount V2 1.5mm    ST Deviation Amount he III -1.4mm    Final Resting /85    Final Resting HR 98    Max Treadmill Speed 4.2    Max Treadmill Grade 16.0    Peak Systolic /96    Peak Diastolic /96    Max HR  147    Stress Phase Resting     Stress Resting Pt Position MANUAL EVENT    Current HR 69    Current /79    Stress Phase Stress    Stage Minute EXE 00:00    Exercise Stage STAGE 1    Current HR 78    Current /79    Stress Phase Stress    Stage Minute EXE 01:00    Exercise Stage STAGE 1    Current HR 99    Current /79    Stress Phase Stress    Stage Minute EXE 02:00    Exercise Stage STAGE 1    Current     Current /79    Stress Phase Stress    Stage Minute EXE 02:03    Exercise Stage STAGE 1    Current     Current /86    Stress Phase Stress    Stage Minute EXE 03:00    Exercise Stage STAGE 2    Current     Current /86    Stress Phase Stress    Stage Minute EXE 04:00    Exercise Stage STAGE 2    Current     Current /86    Stress Phase Stress    Stage Minute EXE 04:33    Exercise Stage STAGE 2    Current     Current /90    Stress Phase Stress    Stage Minute EXE 05:00    Exercise Stage STAGE 2    Current     Current /90    Stress Phase Stress    Stage Minute EXE 06:00    Exercise Stage STAGE 3    Current     Current /90    Stress Phase Stress    Stage Minute EXE 07:00    Exercise Stage STAGE 3    Current     Current /90    Stress Phase Stress    Stage Minute EXE 07:51    Exercise Stage STAGE 3    Current     Current /88    Stress Phase Stress    Stage Minute EXE 08:00    Exercise Stage STAGE 3    Current     Current /88    Stress Phase Stress    Stage Minute EXE 09:00    Exercise Stage STAGE 4    Current     Current /88    Stress Phase Stress    Stage Minute EXE 09:05    Exercise Stage STAGE 4    Current     Current /88    Stress Phase Recovery    Stage Minute REC 00:13    Exercise Stage Recovery    Current     Current /88    Stress Phase Recovery    Stage Minute REC 00:55    Exercise Stage Recovery    Current     Current /88    Stress Phase Recovery    Stage Minute  REC 01:51    Exercise Stage Recovery    Current     Current /96    Stress Phase Recovery    Stage Minute REC 01:55    Exercise Stage Recovery    Current     Current /96    Stress Phase Recovery    Stage Minute REC 02:55    Exercise Stage Recovery    Current     Current /96    Stress Phase Recovery    Stage Minute REC 03:07    Exercise Stage Recovery    Current     Current /81    Stress Phase Recovery    Stage Minute REC 03:55    Exercise Stage Recovery    Current     Current /81    Stress Phase Recovery    Stage Minute REC 04:55    Exercise Stage Recovery    Current     Current /81    Stress Phase Recovery    Stage Minute REC 05:25    Exercise Stage Recovery    Current     Current /85    Stress Phase Recovery    Stage Minute REC 05:55    Exercise Stage Recovery    Current     Current /85    Stress Phase Recovery    Stage Minute REC 06:55    Exercise Stage Recovery    Current     Current /85    Stress Phase Recovery    Stage Minute REC 07:55    Exercise Stage Recovery    Current     Current /85    Stress Phase Recovery    Stage Minute REC 08:55    Exercise Stage Recovery    Current     Current /85    Stress Phase Recovery    Stage Minute REC 09:55    Exercise Stage Recovery    Current HR 97    Current /85    Stress Phase Recovery    Stage Minute REC 10:06    Exercise Stage Recovery    Current HR 98    Current /85    Max Predicted HR  92    Rate Pressure Product 24,402.0    Angina Index 0    Narrative      The exercise nuclear stress test is negative for inducible myocardial   ischemia or infarction.    The exercise stress electrocardiogram is negative for inducible   ischemic EKG changes.    The patient's exercise capacity is above average for age and gender.   The patient exercised for 9:05 minutes on the Jose A protocol, achieving   10.5 METs and 92% the age-predicted  maximum heart rate. The patient did   not experience chest pain.    The left ventricular ejection fraction at stress is greater than 70%.    The patient is at a low risk of future cardiac ischemic events.    There is no prior study for comparison.         Discharge Medications   Current Discharge Medication List        CONTINUE these medications which have NOT CHANGED    Details   multivitamin, therapeutic (THERA-VIT) TABS tablet Take 1 tablet by mouth daily           Allergies   No Known Allergies

## 2023-08-07 NOTE — PROVIDER NOTIFICATION
Paged provider Mumtaz Schmitt with Stress Test results in chart. No chest pain per pt, NSR    Missy Virgen RN

## 2023-08-07 NOTE — PROGRESS NOTES
Pt had chest pain with exertion.  Substernal chest pressure lasting over 3 hours.  Did not go away.  Second episode states felt fatigue with N/V diaphoretic.  Was diaphoretic with this last episode.  Trop negative x3.  Strong family hx with his father.  Amber Hannon RN

## 2023-08-07 NOTE — PROGRESS NOTES
PRIMARY DIAGNOSIS: CHEST PAIN  OUTPATIENT/OBSERVATION GOALS TO BE MET BEFORE DISCHARGE:  1. Ruled out acute coronary syndrome (negative or stable Troponin):  Yes  2. Pain Status: Pain free.  3. Appropriate provocative testing performed: Yes - awaiting results  - Stress Test Procedure: Nuclear exercise stress test  - Interpretation of cardiac rhythm per telemetry tech: NSR     4. Cleared by Consultants (if applicable):N/A  5. Return to near baseline physical activity: Yes     Discharge Planner Nurse   Safe discharge environment identified: Yes  Barriers to discharge: Yes - awaiting stress test results       Entered by: Mary Byrd RN 08/07/2023         Please review provider order for any additional goals.   Nurse to notify provider when observation goals have been met and patient is ready for discharge.